# Patient Record
Sex: MALE | URBAN - METROPOLITAN AREA
[De-identification: names, ages, dates, MRNs, and addresses within clinical notes are randomized per-mention and may not be internally consistent; named-entity substitution may affect disease eponyms.]

---

## 2017-08-28 ENCOUNTER — EMERGENCY (EMERGENCY)
Facility: HOSPITAL | Age: 32
LOS: 1 days | Discharge: HOME | End: 2017-08-28
Attending: EMERGENCY MEDICINE | Admitting: EMERGENCY MEDICINE
Payer: COMMERCIAL

## 2017-08-28 ENCOUNTER — LABORATORY RESULT (OUTPATIENT)
Age: 32
End: 2017-08-28

## 2017-08-28 VITALS
OXYGEN SATURATION: 100 % | RESPIRATION RATE: 20 BRPM | SYSTOLIC BLOOD PRESSURE: 117 MMHG | DIASTOLIC BLOOD PRESSURE: 78 MMHG | TEMPERATURE: 99 F | HEART RATE: 67 BPM

## 2017-08-28 DIAGNOSIS — K46.9 UNSPECIFIED ABDOMINAL HERNIA WITHOUT OBSTRUCTION OR GANGRENE: Chronic | ICD-10-CM

## 2017-08-28 PROCEDURE — 76882 US LMTD JT/FCL EVL NVASC XTR: CPT | Mod: 26

## 2017-08-28 PROCEDURE — 99285 EMERGENCY DEPT VISIT HI MDM: CPT | Mod: 25

## 2017-08-28 RX ORDER — OXYCODONE AND ACETAMINOPHEN 5; 325 MG/1; MG/1
1 TABLET ORAL ONCE
Qty: 0 | Refills: 0 | Status: DISCONTINUED | OUTPATIENT
Start: 2017-08-28 | End: 2017-08-28

## 2017-08-28 RX ORDER — IBUPROFEN 200 MG
600 TABLET ORAL ONCE
Qty: 0 | Refills: 0 | Status: COMPLETED | OUTPATIENT
Start: 2017-08-28 | End: 2017-08-28

## 2017-08-28 RX ORDER — IBUPROFEN 200 MG
1 TABLET ORAL
Qty: 30 | Refills: 0 | OUTPATIENT
Start: 2017-08-28

## 2017-08-28 RX ORDER — CEPHALEXIN 500 MG
500 CAPSULE ORAL ONCE
Qty: 0 | Refills: 0 | Status: COMPLETED | OUTPATIENT
Start: 2017-08-28 | End: 2017-08-28

## 2017-08-28 RX ORDER — CEPHALEXIN 500 MG
1 CAPSULE ORAL
Qty: 28 | Refills: 0 | OUTPATIENT
Start: 2017-08-28 | End: 2017-09-04

## 2017-08-28 RX ADMIN — Medication 500 MILLIGRAM(S): at 14:14

## 2017-08-28 RX ADMIN — Medication 600 MILLIGRAM(S): at 14:14

## 2017-08-28 RX ADMIN — OXYCODONE AND ACETAMINOPHEN 1 TABLET(S): 5; 325 TABLET ORAL at 14:14

## 2017-08-28 NOTE — ED PROVIDER NOTE - SKIN COLOR
erythematous rash with central umbilication approx 6x6cm, no crepitus, mild ttp, no fluctuance, indurated

## 2017-08-28 NOTE — ED PROVIDER NOTE - CONSTITUTIONAL, MLM
normal... Well appearing, well nourished, awake, alert, oriented to person, place, time/situation, uncomfortable.

## 2017-08-28 NOTE — ED PROVIDER NOTE - MEDICAL DECISION MAKING DETAILS
30 y/o male with ?insect bite to ant aspect of right thigh with cellulitic changes, no signs of fluctuance indicating fluid collection.  Will give pain meds, abx, prescription for same, pcp f/u.

## 2017-08-28 NOTE — ED PROCEDURE NOTE - PROCEDURE ADDITIONAL DETAILS
[address], ultrasound, musculoskeletal, limited    Focused ED Ultrasound of RLE thigh.     Findings:  R thigh subcutaneous tissue thickening and hyperechoic change.  Fascia wnl.  Cobblestoning (edema) in the soft tissues as well as a tiny 0.5cm collection of fluid with no doppler flow just under the skin lesion.      Impression:: R thigh cellulitis and tiny abscess measuring 0.5cm and 0.5cm deep.    Procedure note and images placed in chart

## 2017-08-28 NOTE — ED PROVIDER NOTE - OBJECTIVE STATEMENT
32 y/o male c/o right thigh redness and swelling.  States he thinks he was bitten by an insect a few days ago.  Since that time, gradually worsening pain, redness, and swelling to top of right thigh.  Tetanus utd.  Denies f/c, ha, neck stiffness, cp, sob, cough, abd pain, n/v/d, dysuria.

## 2017-08-29 PROBLEM — Z00.00 ENCOUNTER FOR PREVENTIVE HEALTH EXAMINATION: Status: ACTIVE | Noted: 2017-08-29

## 2018-09-23 NOTE — ED ADULT TRIAGE NOTE - SPO2 (%)
64 yo male hx of HTN  here c/o left elbow swelling x 2 days. Patient states he banged his elbow 1 week ago. 2 days ago noted swelling to left elbow. No fever. No numbness/tingling.
100

## 2020-06-11 NOTE — ED ADULT TRIAGE NOTE - SOURCE OF INFORMATION
Bed: RWR 03  Expected date:   Expected time:   Means of arrival:   Comments:  
Dr Casey with pt for evaluation.  
LOC: The patient is awake, alert, and oriented to place, time, situation. Affect is appropriate.  Speech is appropriate and clear.     APPEARANCE: Patient resting in chair; appears uncomfortable but in no acute distress.  Patient is clean and well groomed.    SKIN: The skin is warm and dry; color consistent with ethnicity.  Patient has normal skin turgor and moist mucus membranes.  Skin intact; no breakdown or bruising noted.     MUSCULOSKELETAL: Patient moving left upper and lower extremities without difficulty but has diffulty moving RUE due to recent shoulder surgery.  Denies weakness.     RESPIRATORY: Airway is open and patent. Respirations spontaneous, even, easy, and non-labored.  Patient has a normal effort and rate.  No accessory muscle use noted. Pt reporting SOB with non-productive cough.     CARDIAC:  Pt is hypertensive and tachycardic noted.  No peripheral edema noted curently. Reports pain in the middle of the chest with deep cough.      ABDOMEN: Soft and non tender to palpation.  No distention noted.     NEUROLOGIC: Eyes open spontaneously.  Behavior appropriate to situation.  Follows commands; facial expression symmetrical.  Purposeful motor response noted; normal sensation in all extremities with exception of numbness and tingling in the right arm and hand since recent surgery.        
Patient